# Patient Record
Sex: MALE | Race: BLACK OR AFRICAN AMERICAN | NOT HISPANIC OR LATINO | Employment: FULL TIME | URBAN - METROPOLITAN AREA
[De-identification: names, ages, dates, MRNs, and addresses within clinical notes are randomized per-mention and may not be internally consistent; named-entity substitution may affect disease eponyms.]

---

## 2017-03-14 PROBLEM — Z98.890 STATUS POST INCISION AND DRAINAGE: Status: ACTIVE | Noted: 2017-03-14

## 2017-03-14 PROBLEM — Z48.02 ENCOUNTER FOR STAPLE REMOVAL: Status: ACTIVE | Noted: 2017-03-14

## 2018-04-22 ENCOUNTER — HOSPITAL ENCOUNTER (EMERGENCY)
Facility: HOSPITAL | Age: 28
Discharge: HOME | End: 2018-04-22
Attending: EMERGENCY MEDICINE
Payer: MEDICAID

## 2018-04-22 VITALS
OXYGEN SATURATION: 100 % | HEART RATE: 76 BPM | HEIGHT: 66 IN | SYSTOLIC BLOOD PRESSURE: 129 MMHG | RESPIRATION RATE: 18 BRPM | BODY MASS INDEX: 26.52 KG/M2 | DIASTOLIC BLOOD PRESSURE: 80 MMHG | WEIGHT: 165 LBS | TEMPERATURE: 99 F

## 2018-04-22 DIAGNOSIS — L03.116 CELLULITIS OF LEFT LOWER EXTREMITY: ICD-10-CM

## 2018-04-22 DIAGNOSIS — L02.419 ABSCESS OF CALF: ICD-10-CM

## 2018-04-22 DIAGNOSIS — M79.662 PAIN OF LEFT CALF: Primary | ICD-10-CM

## 2018-04-22 LAB
ANION GAP SERPL CALC-SCNC: 9 MMOL/L
BASOPHILS # BLD AUTO: 0.01 K/UL
BASOPHILS NFR BLD: 0.1 %
BUN SERPL-MCNC: 14 MG/DL
CALCIUM SERPL-MCNC: 10.4 MG/DL
CHLORIDE SERPL-SCNC: 103 MMOL/L
CO2 SERPL-SCNC: 29 MMOL/L
CREAT SERPL-MCNC: 1.3 MG/DL
DIFFERENTIAL METHOD: ABNORMAL
EOSINOPHIL # BLD AUTO: 0 K/UL
EOSINOPHIL NFR BLD: 0.3 %
ERYTHROCYTE [DISTWIDTH] IN BLOOD BY AUTOMATED COUNT: 15.4 %
EST. GFR  (AFRICAN AMERICAN): >60 ML/MIN/1.73 M^2
EST. GFR  (NON AFRICAN AMERICAN): >60 ML/MIN/1.73 M^2
GLUCOSE SERPL-MCNC: 90 MG/DL
HCT VFR BLD AUTO: 46 %
HGB BLD-MCNC: 15 G/DL
IMM GRANULOCYTES # BLD AUTO: 0.03 K/UL
IMM GRANULOCYTES NFR BLD AUTO: 0.3 %
LYMPHOCYTES # BLD AUTO: 2.8 K/UL
LYMPHOCYTES NFR BLD: 32.2 %
MCH RBC QN AUTO: 26.3 PG
MCHC RBC AUTO-ENTMCNC: 32.6 G/DL
MCV RBC AUTO: 81 FL
MONOCYTES # BLD AUTO: 0.5 K/UL
MONOCYTES NFR BLD: 5.3 %
NEUTROPHILS # BLD AUTO: 5.3 K/UL
NEUTROPHILS NFR BLD: 61.8 %
NRBC BLD-RTO: 0 /100 WBC
PLATELET # BLD AUTO: 228 K/UL
PMV BLD AUTO: 10.3 FL
POTASSIUM SERPL-SCNC: 3.9 MMOL/L
RBC # BLD AUTO: 5.71 M/UL
SODIUM SERPL-SCNC: 141 MMOL/L
WBC # BLD AUTO: 8.64 K/UL

## 2018-04-22 PROCEDURE — 96374 THER/PROPH/DIAG INJ IV PUSH: CPT | Mod: 59

## 2018-04-22 PROCEDURE — 80048 BASIC METABOLIC PNL TOTAL CA: CPT

## 2018-04-22 PROCEDURE — 63600175 PHARM REV CODE 636 W HCPCS: Performed by: EMERGENCY MEDICINE

## 2018-04-22 PROCEDURE — 85025 COMPLETE CBC W/AUTO DIFF WBC: CPT

## 2018-04-22 PROCEDURE — 25000003 PHARM REV CODE 250: Performed by: EMERGENCY MEDICINE

## 2018-04-22 PROCEDURE — 10060 I&D ABSCESS SIMPLE/SINGLE: CPT | Mod: ,,, | Performed by: EMERGENCY MEDICINE

## 2018-04-22 PROCEDURE — 99284 EMERGENCY DEPT VISIT MOD MDM: CPT | Mod: 25

## 2018-04-22 PROCEDURE — 10060 I&D ABSCESS SIMPLE/SINGLE: CPT

## 2018-04-22 PROCEDURE — 99283 EMERGENCY DEPT VISIT LOW MDM: CPT | Mod: 25,,, | Performed by: EMERGENCY MEDICINE

## 2018-04-22 RX ORDER — FENTANYL CITRATE 50 UG/ML
100 INJECTION, SOLUTION INTRAMUSCULAR; INTRAVENOUS ONCE AS NEEDED
Status: COMPLETED | OUTPATIENT
Start: 2018-04-22 | End: 2018-04-22

## 2018-04-22 RX ORDER — HYDROCODONE BITARTRATE AND ACETAMINOPHEN 5; 325 MG/1; MG/1
1 TABLET ORAL EVERY 4 HOURS PRN
Qty: 10 TABLET | Refills: 0 | Status: SHIPPED | OUTPATIENT
Start: 2018-04-22

## 2018-04-22 RX ORDER — HYDROCODONE BITARTRATE AND ACETAMINOPHEN 10; 325 MG/1; MG/1
1 TABLET ORAL
Status: COMPLETED | OUTPATIENT
Start: 2018-04-22 | End: 2018-04-22

## 2018-04-22 RX ORDER — SULFAMETHOXAZOLE AND TRIMETHOPRIM 800; 160 MG/1; MG/1
2 TABLET ORAL 2 TIMES DAILY
Qty: 28 TABLET | Refills: 0 | Status: SHIPPED | OUTPATIENT
Start: 2018-04-22 | End: 2018-04-29

## 2018-04-22 RX ORDER — FENTANYL CITRATE 50 UG/ML
100 INJECTION, SOLUTION INTRAMUSCULAR; INTRAVENOUS ONCE AS NEEDED
Status: DISCONTINUED | OUTPATIENT
Start: 2018-04-22 | End: 2018-04-22

## 2018-04-22 RX ORDER — LIDOCAINE HYDROCHLORIDE AND EPINEPHRINE 10; 10 MG/ML; UG/ML
10 INJECTION, SOLUTION INFILTRATION; PERINEURAL ONCE
Status: COMPLETED | OUTPATIENT
Start: 2018-04-22 | End: 2018-04-22

## 2018-04-22 RX ORDER — LIDOCAINE HYDROCHLORIDE AND EPINEPHRINE 10; 10 MG/ML; UG/ML
10 INJECTION, SOLUTION INFILTRATION; PERINEURAL ONCE
Status: DISCONTINUED | OUTPATIENT
Start: 2018-04-22 | End: 2018-04-22

## 2018-04-22 RX ORDER — FENTANYL CITRATE 50 UG/ML
100 INJECTION, SOLUTION INTRAMUSCULAR; INTRAVENOUS
Status: COMPLETED | OUTPATIENT
Start: 2018-04-22 | End: 2018-04-22

## 2018-04-22 RX ORDER — IBUPROFEN 600 MG/1
600 TABLET ORAL
Status: COMPLETED | OUTPATIENT
Start: 2018-04-22 | End: 2018-04-22

## 2018-04-22 RX ORDER — IBUPROFEN 600 MG/1
600 TABLET ORAL EVERY 6 HOURS PRN
Qty: 20 TABLET | Refills: 0 | Status: SHIPPED | OUTPATIENT
Start: 2018-04-22

## 2018-04-22 RX ORDER — SULFAMETHOXAZOLE AND TRIMETHOPRIM 800; 160 MG/1; MG/1
2 TABLET ORAL
Status: COMPLETED | OUTPATIENT
Start: 2018-04-22 | End: 2018-04-22

## 2018-04-22 RX ADMIN — FENTANYL CITRATE 100 MCG: 50 INJECTION, SOLUTION INTRAMUSCULAR; INTRAVENOUS at 06:04

## 2018-04-22 RX ADMIN — LIDOCAINE HYDROCHLORIDE,EPINEPHRINE BITARTRATE 10 ML: 10; .01 INJECTION, SOLUTION INFILTRATION; PERINEURAL at 07:04

## 2018-04-22 RX ADMIN — FENTANYL CITRATE 100 MCG: 50 INJECTION, SOLUTION INTRAMUSCULAR; INTRAVENOUS at 07:04

## 2018-04-22 RX ADMIN — SULFAMETHOXAZOLE AND TRIMETHOPRIM 2 TABLET: 800; 160 TABLET ORAL at 04:04

## 2018-04-22 RX ADMIN — IBUPROFEN 600 MG: 600 TABLET, FILM COATED ORAL at 07:04

## 2018-04-22 RX ADMIN — HYDROCODONE BITARTRATE AND ACETAMINOPHEN 1 TABLET: 10; 325 TABLET ORAL at 04:04

## 2018-04-22 NOTE — ED PROVIDER NOTES
Encounter Date: 4/22/2018    SCRIBE #1 NOTE: I, Moe Reyes, am scribing for, and in the presence of,  Dr. Reed. I have scribed the following portions of the note - the APC attestation.       History     Chief Complaint   Patient presents with    Abscess     Abscess drained last night at [facility].      Mr Wood is a 27YOAAM who presents with opened abscess and associated leg pain and swelling x 1 day, no PMHx. Patient had LLE abscess drained at [facility] yesterday with no complications, leg was not imaged and he was not sent home with abx. He reports new onset of LLE swelling and pain this morning. He is NWB 2/2 pain scale and has not taken anything for pain. He denies paresthesia in LLE, fevers, chills, headache, abdominal pain, N/V/D.          Review of patient's allergies indicates:  No Known Allergies  History reviewed. No pertinent past medical history.  Past Surgical History:   Procedure Laterality Date    FOREARM SURGERY       Family History   Problem Relation Age of Onset    No Known Problems Mother     No Known Problems Father      Social History   Substance Use Topics    Smoking status: Current Every Day Smoker    Smokeless tobacco: Never Used    Alcohol use Not on file     Review of Systems   Constitutional: Negative for chills, diaphoresis, fatigue and fever.   HENT: Negative for congestion, nosebleeds, sinus pain, sinus pressure and tinnitus.    Eyes: Negative for photophobia and visual disturbance.   Respiratory: Negative for cough, choking, shortness of breath and wheezing.    Cardiovascular: Positive for leg swelling (unilateral x 1 days distal to abscess site). Negative for chest pain and palpitations.   Gastrointestinal: Negative for abdominal pain, constipation, diarrhea, nausea and vomiting.   Genitourinary: Negative for discharge, dysuria, flank pain, hematuria and penile pain.   Musculoskeletal: Positive for gait problem (NWB 2/2 pain scale LLE) and myalgias.  Negative for arthralgias and back pain.   Skin: Positive for color change and wound.   Allergic/Immunologic: Negative for immunocompromised state.   Neurological: Negative for dizziness, syncope, weakness, light-headedness, numbness and headaches.   Psychiatric/Behavioral: Negative for agitation, confusion and decreased concentration. The patient is not nervous/anxious.        Physical Exam     Initial Vitals [04/22/18 1446]   BP Pulse Resp Temp SpO2   134/72 82 16 98.8 °F (37.1 °C) 97 %      MAP       92.67         Physical Exam    Nursing note and vitals reviewed.  Constitutional: He appears well-developed and well-nourished. He is not diaphoretic. No distress.   HENT:   Head: Normocephalic and atraumatic.   Right Ear: External ear normal.   Left Ear: External ear normal.   Mouth/Throat: Oropharynx is clear and moist.   Eyes: Conjunctivae and EOM are normal. Pupils are equal, round, and reactive to light.   Neck: Normal range of motion.   Cardiovascular: Normal rate, regular rhythm, normal heart sounds and intact distal pulses.   Pulmonary/Chest: Breath sounds normal. No respiratory distress.   Abdominal: Soft. Bowel sounds are normal.   Musculoskeletal: Normal range of motion. He exhibits edema and tenderness.   Neurological: He is alert and oriented to person, place, and time. He has normal strength. No cranial nerve deficit.   Skin: Skin is warm and dry. Capillary refill takes less than 2 seconds. Abscess noted.        approx 1 cm linear incision over abscess on LLE medial to tibia. Abscess is not actively draining. Very TTP over soft tissues distal to abscess, edema to LLE most noticeable over medial malleolus. Indurated tracking apparent medial and dorsal to abscess with associated fluctuation. Boston sign negative. Foot is neurovascularly intact with no paresthesia.   Psychiatric: He has a normal mood and affect. His behavior is normal. Thought content normal.         ED Course   I & D - Incision and  "Drainage  Date/Time: 4/22/2018 8:07 PM  Performed by: TWYLA SPRING.  Authorized by: TWYLA SPRING   Consent Done: Yes  Consent: Verbal consent obtained.  Required items: required blood products, implants, devices, and special equipment available  Patient identity confirmed: MRN and name  Time out: Immediately prior to procedure a "time out" was called to verify the correct patient, procedure, equipment, support staff and site/side marked as required.  Body area: lower extremity  Location details: left leg  Anesthesia: local infiltration    Anesthesia:  Local Anesthetic: lidocaine 1% with epinephrine  Anesthetic total: 10 mL  Patient sedated: no  Scalpel size: 11  Incision type: single straight  Complexity: simple  Drainage: pus and  serosanguinous  Drainage amount: scant  Wound treatment: expression of material  Packing material: 1/2 in iodoform gauze  Patient tolerance: Patient tolerated the procedure well with no immediate complications        Labs Reviewed   CBC W/ AUTO DIFFERENTIAL - Abnormal; Notable for the following:        Result Value    MCV 81 (*)     MCH 26.3 (*)     RDW 15.4 (*)     All other components within normal limits   BASIC METABOLIC PANEL        Imaging Results          US Extremity Non Vascular Limited Left (Final result)  Result time 04/22/18 18:37:59    Final result by Agustin Piedra MD (04/22/18 18:37:59)                 Impression:      Subcutaneous edema and disorganized free fluid at the site of patient's pain in the medial left calf.  No organized fluid collection.    Electronically signed by resident: Reid Menjivar  Date:    04/22/2018  Time:    17:54    Electronically signed by: Agustin Piedra MD  Date:    04/22/2018  Time:    18:37             Narrative:    EXAMINATION:  US EXTREMITY NON VASCULAR LIMITED LEFT    CLINICAL HISTORY:  Left calf pain, swelling;    TECHNIQUE:  Grayscale and color Doppler flow evaluation obtained at the area of patient's pain within the left medial " calf.    COMPARISON:  None    FINDINGS:  Small volume of subcutaneous edema and irregular, non organized focus of free fluid at the site of patient's pain measuring 0.6 x 0.7 x 0.6 cm.  There is a patent adjacent left superficial vein.                               US Lower Extremity Veins Left (Final result)  Result time 04/22/18 18:39:15    Final result by Agustin Piedra MD (04/22/18 18:39:15)                 Impression:      No evidence of deep venous thrombosis in the left lower extremity.    Electronically signed by resident: Reid Menjivar  Date:    04/22/2018  Time:    17:53    Electronically signed by: Agustin Piedra MD  Date:    04/22/2018  Time:    18:39             Narrative:    EXAMINATION:  US LOWER EXTREMITY VEINS LEFT    CLINICAL HISTORY:  Pain in left lower leg.    TECHNIQUE:  Duplex and color flow Doppler evaluation and graded compression of the left lower extremity veins was performed.    COMPARISON:  None    FINDINGS:  Left thigh veins: The common femoral, femoral, popliteal, upper greater saphenous, and deep femoral veins are patent and free of thrombus. The veins are normally compressible and have normal phasic flow and augmentation response.    Left calf veins: The visualized calf veins are patent.    Contralateral CFV: No evidence of deep venous thrombosis in either lower extremity.    Miscellaneous: None                               US Lower Extremity Arteries Left (Final result)  Result time 04/22/18 18:40:21    Final result by Agustin Piedra MD (04/22/18 18:40:21)                 Impression:      Patent left lower extremity arterial system without evidence for stenosis .    Electronically signed by resident: Reid Menjivar  Date:    04/22/2018  Time:    17:47    Electronically signed by: Agustin Piedra MD  Date:    04/22/2018  Time:    18:40             Narrative:    EXAMINATION:  US LOWER EXTREMITY ARTERIES LEFT    CLINICAL HISTORY:  Left calf pain, swelling along vascular  distribution;    COMPARISON:  Grayscale and color Doppler flow evaluation the left lower extremity arteries obtained.  Spectral analysis also performed.    FINDINGS:  There are normal triphasic waveforms throughout the left lower extremity with velocities as follows:    -CFA: 92.9 cm/sec    -profunda: 82.6 cm/sec    -proximal SFA: 98.2 cm/sec    -mid SFA: 111 cm/sec    -distal SFA: 91.1 cm/sec    -popliteal: 89.0 cm/sec    -PTA: 76.2 cm/sec    -CARLOS: 77.6 cm/sec                                   Medical Decision Making:   History:   Old Medical Records: I decided to obtain old medical records.  Initial Assessment:   Patient with abscess drainage at [facility] presents today for acute edema, erythema and induration of LLE distal to abscess site. Denies constitutional symptoms, has not taken anything for pain. NWB 2/2 pain status. Foot is neurovascularly intact.  Differential Diagnosis:   DDX cellulitis 2/2 abscess, fasciitis. Physical exam and history taking decrease clinical suspicion for DVT (Perc negative), venous stasis, arterial insufficiency, septicemia, septic shock, CHF, CAMPBELL.  Clinical Tests:   Lab Tests: Ordered and Reviewed  Radiological Study: Ordered and Reviewed  ED Management:  Patient moved to room 22 due to acuity of condition. Acute Cellulitis suspected with possible tracking of existing abscess. Bedside U/S reveals moderately extensive cellulitis of LLE with minimal tracking. There appeared to be acute inflammation surrounding length of vasculature in LLE, patient will get formal vascular ultrasound to help differentiate imaging results. Given Fentanyl for pain management and Bactrim for MRSA coverage.  Vasc U/S results as listed above, they do not suspect DVT, arterial insufficiency nor thrombophlebitis, arteritis. Small abscess still apparent infero-lateral to prior incision. I&D performed in ED with mild amount of purulent discharge. Abscess packed and patient given very strict ED return and follow up  precautions. He agreed with plan of care and voiced understanding. Home Rx Bactrim given.             Scribe Attestation:   Scribe #1: I performed the above scribed service and the documentation accurately describes the services I performed. I attest to the accuracy of the note.    Attending Attestation:     Physician Attestation Statement for NP/PA:   I have conducted a face to face encounter with this patient in addition to the NP/PA, due to Medical Complexity    Other NP/PA Attestation Additions:    History of Present Illness: 28 yo male presents with atraumatic L medial calf swelling. I&D performed yesterday at urgent care but pt returns for diffuse swelling of calf. On exam pt with swollen medial calf with tenderness extending from the site of the I&D distally. Pt has full ROM of the knee and ankle. I doubt septic joint. His skin is warm and swollen, concerning for cellulitis. Will administer bactrim. Bedside US performed by myself reveals cobblestoning but no abscess amenable to drainage. There is tenderness upon palpation throughout a vascular structure extending from the site of I&D. My initial differential diagnoses include but are not limited to: cellulitis, acute thrombosis, thrombophlebitis. Will administer bactrim, analgesics, ice and elevation. Will obtain US for further evaluation.     Reassessment: Formal ultrasound reviewed. Performed I&D as above with scant drainage. Wick placed. Pt able to ambulate with crutches at this time.Overall appears to be abscess with extensive surrounding cellulitis.  This is not necrotizing soft tissue infection. Pt appears improved. After lengthy discussion with pt, I feel he is stable for outpatient management. Discharge on course of Bactrim. Scripts for analgesics. Provided with instructions for health care follow up in 2-3 days for wound re-eval. Extensive return precautions.         Physician Attestation for Scribe:      Comments: I, Dr. Devendra Reed, personally  performed the services described in this documentation. All medical record entries made by the scribe were at my direction and in my presence.  I have reviewed the chart and agree that the record reflects my personal performance and is accurate and complete. Devendra Reed MD.  8:10 PM 04/22/2018                 Clinical Impression:   The primary encounter diagnosis was Pain of left calf. Diagnoses of Abscess of calf and Cellulitis of left lower extremity were also pertinent to this visit.    Disposition:   Disposition: Discharged  Condition: Stable       Margarita Pedro PA-C  04/22/2018                   Margarita Pedro PA-C  04/22/18 [id number]       Devendra Reed MD  04/23/18 2351

## 2018-04-22 NOTE — ED TRIAGE NOTES
Has an abscess to his LLE that was drained yesterday at [facility] in [facility].  Today he is coming to [facility] due to his leg being painful and ankle swelling.  Pt identifiers checked and correct    LOC:   · The pt is awake, alert, and aware of environment with an appropriate affect,  as well as speaking appropriately; AAOx3 to person, place, and situation  APPEARANCE:   · Pt resting comfortably and in no acute distress; clean and well groomed  SKIN:   · Skin is warm and dry; color consistent with ethnicity; pt has normal skin turgor and moist mucus membranes    MUSCULOSKELETAL:   · Pt moving all extremities well; absent of obvious deformities; Ambulates independently  RESPIRATORY:   · Airway is open and patent; respirations are spontaneous; normal effort and rate noted; absent of accessory-muscle use  · Breath sounds auscultated in all lung fields are noted to be clear and absent of adventitious sounds  CARDIAC:   · Pt denies chest pain; normal heart sounds auscultated; Pt has no peripheral edema noted; capillary refill < 3 seconds  · 2+ pulses palpated in all extremities   ABDOMEN:   · Soft and non-tender to palpation; no abnormal distention noted/reported; bowel sounds present x 4  NEUROLOGIC:   · PERRL, 3mm bilaterally, eyes open spontaneously; pt follows commands; facial expression symmetrical; equal hand  bilaterally; purposeful motor response noted  · Normal sensation to touch reported in distal region of all extremities

## 2018-04-23 ENCOUNTER — PES CALL (OUTPATIENT)
Dept: ADMINISTRATIVE | Facility: CLINIC | Age: 28
End: 2018-04-23

## 2018-04-23 NOTE — DISCHARGE INSTRUCTIONS
You have a skin infection. You must take bactrim as prescribed. This is an antibiotic.  Remove the cotton skin wick from your leg in 2 days.  Follow up with a primary care doctor or the ED in the next 2-3 days to make sure this infection is improving.  Return to the ED for any fevers, redness streaking up your leg, inability to walk, foul smelling drainage.

## 2018-05-07 ENCOUNTER — HOSPITAL ENCOUNTER (EMERGENCY)
Facility: HOSPITAL | Age: 28
Discharge: HOME | End: 2018-05-07
Attending: EMERGENCY MEDICINE
Payer: MEDICAID

## 2018-05-07 VITALS
RESPIRATION RATE: 18 BRPM | WEIGHT: 160 LBS | TEMPERATURE: 98 F | BODY MASS INDEX: 24.25 KG/M2 | DIASTOLIC BLOOD PRESSURE: 63 MMHG | OXYGEN SATURATION: 98 % | HEIGHT: 68 IN | HEART RATE: 71 BPM | SYSTOLIC BLOOD PRESSURE: 112 MMHG

## 2018-05-07 DIAGNOSIS — L03.116 CELLULITIS OF LEFT LEG: ICD-10-CM

## 2018-05-07 DIAGNOSIS — M79.89 LEFT LEG SWELLING: Primary | ICD-10-CM

## 2018-05-07 PROCEDURE — 99283 EMERGENCY DEPT VISIT LOW MDM: CPT

## 2018-05-07 PROCEDURE — 99281 EMR DPT VST MAYX REQ PHY/QHP: CPT | Mod: ,,, | Performed by: PHYSICIAN ASSISTANT

## 2018-05-07 NOTE — ED NOTES
Pt received for evaluation of left lower leg pain distal to abscess that was drained last week - states he finished antibiotic course yesterday and the pain worsened today at work - no active drainage noted to wound - no obvious swelling noted to area

## 2018-05-07 NOTE — ED PROVIDER NOTES
Encounter Date: 5/7/2018    SCRIBE #1 NOTE: I, Kyle Ni, am scribing for, and in the presence of,  Dr. Reed. I have scribed the following portions of the note - the APC attestation.       History     Chief Complaint   Patient presents with    Leg Pain     and swelling to L lower leg, had it drained, took antibiotics and now increased     This is a 27-year-old male with no known significant past medical history presents ED with a chief complaint of left leg pain.  Patient was recently seen and treated for an abscess with cellulitis of the medial left lower leg.  He completed a course of Bactrim yesterday.  Patient states that overall he has seen improvement in the area that required incision and drainage.  He continues to experience mild swelling of the left lower leg and ankle, especially after long hours standing at work.  Patient does construction work.  He states that he has not had any fevers, chills, nausea/vomiting, decreased range of motion of the left lower extremity, paresthesias or additional complaints.          Review of patient's allergies indicates:  No Known Allergies  No past medical history on file.  Past Surgical History:   Procedure Laterality Date    FOREARM SURGERY       Family History   Problem Relation Age of Onset    No Known Problems Mother     No Known Problems Father      Social History   Substance Use Topics    Smoking status: Current Every Day Smoker    Smokeless tobacco: Never Used    Alcohol use Not on file     Review of Systems   Constitutional: Negative for chills and fever.   HENT: Negative for sore throat.    Respiratory: Negative for shortness of breath.    Cardiovascular: Positive for leg swelling. Negative for chest pain.   Gastrointestinal: Negative for nausea and vomiting.   Genitourinary: Negative for dysuria.   Musculoskeletal: Negative for back pain.   Skin: Positive for wound.   Neurological: Negative for weakness.   Hematological: Does not bruise/bleed  easily.       Physical Exam     Initial Vitals [05/07/18 1552]   BP Pulse Resp Temp SpO2   112/63 71 18 98.4 °F (36.9 °C) 98 %      MAP       79.33         Physical Exam    Constitutional: He appears well-developed and well-nourished. No distress.   HENT:   Head: Atraumatic.   Eyes: Conjunctivae and EOM are normal. Pupils are equal, round, and reactive to light.   Neck: Normal range of motion. Neck supple.   Cardiovascular: Normal rate, regular rhythm, normal heart sounds and intact distal pulses. Exam reveals no decreased pulses.    Pulmonary/Chest: Breath sounds normal. No respiratory distress. He has no wheezes. He has no rhonchi. He has no rales.   Abdominal: Soft. Bowel sounds are normal. There is no tenderness. There is no rebound and no guarding.   Musculoskeletal:        Left lower leg: He exhibits swelling (mild). He exhibits no tenderness and no bony tenderness.   1cm open superficial wound to the medial left calf. There is no induration or fluctuance. No drainage.   Mild lower leg and ankle edema. Nontender to palpation. He has FROM of the left knee and ankle.    Neurological: He is alert and oriented to person, place, and time. He has normal strength. No cranial nerve deficit or sensory deficit.   Skin: Skin is warm and dry. No rash noted.         ED Course   Procedures  Labs Reviewed - No data to display      Imaging Results    None                    APC / Resident Notes:   27 year old male presents with left lower leg swelling following recent abscess with cellulitis.  Overall the abscess has improved significantly from his previous visit and there is no significant cellulitis noted.  He has mild swelling of the lower leg without superimposed tenderness to palpation.  He is neurovascularly intact. I considered but do not suspect a deep space abscess, DVT, or septic arthritis. Reassurance was provided.  Recommended superficial compression and over-the-counter NSAIDs for inflammation.  Rest, ice,  elevation, compression.  Patient was advised to follow up with his PCP in 1 week if no improvement.  Detailed return ED precautions were given.  He is stable for discharge. I discussed the care of this patient with my supervising MD.        Talisha Attestation:   Scribe #1: I performed the above scribed service and the documentation accurately describes the services I performed. I attest to the accuracy of the note.    Attending Attestation:     Physician Attestation Statement for NP/PA:   I have conducted a face to face encounter with this patient in addition to the NP/PA, due to Medical Complexity    Other NP/PA Attestation Additions:    History of Present Illness: History of left calf abscess s/p I&D April 23 with mild persistent swelling to the leg. Pt has since completed antibiotics. Denies fever or drainage. Pt has had no immobilization in the past few weeks and a negative ultrasound at onset of symptoms, I doubt DVT. Pt with no joint effusion to suggest septic joint. There is no skin warmth, erythema, tenderness, or drainage to suggest cellulitis. No persistent induration to suggest abscess. Pt provided with instructions to RICE, compression stockings, and indiciations to return.           Physician Attestation for Scribe:      Comments: I, Dr. Devendra Reed, personally performed the services described in this documentation. All medical record entries made by the scribe were at my direction and in my presence.  I have reviewed the chart and agree that the record reflects my personal performance and is accurate and complete. Devendra Reed MD.  11:06 PM 05/07/2018                 Clinical Impression:   The primary encounter diagnosis was Left leg swelling. A diagnosis of Cellulitis of left leg was also pertinent to this visit.    Disposition:   Disposition: Discharged  Condition: Stable                        Harleen Brennan PA-C  05/07/18 [id number]       Devendra Reed MD  05/07/18 [id number]

## 2018-05-07 NOTE — ED NOTES
teds given to pt - stated he wanted to put it on left leg once home. Instructed in use  and care of stockings

## 2018-05-07 NOTE — DISCHARGE INSTRUCTIONS
Use a compression sock on the left leg during the day or if you are standing for a long time.    Wash with soap and water twice daily. Keep dry and covered.    Come back to the ER if you have redness, fever, or increased swelling.

## 2018-05-09 ENCOUNTER — NURSE TRIAGE (OUTPATIENT)
Dept: ADMINISTRATIVE | Facility: CLINIC | Age: 28
End: 2018-05-09

## 2018-05-09 NOTE — TELEPHONE ENCOUNTER
Reason for Disposition   Swollen foot (EXCEPTIONs: localized bump from bunions, calluses, insect bite, sting)    Protocols used: ST FOOT PAIN-A-[place]    Pt reports that the bottom of his foot is numb along with 3 of his toes. Also reports some swelling as well. Denies sore or change in color. Pt does not have a PCP- unable to schedule appt. Advised urgent care or ER